# Patient Record
Sex: MALE | Race: WHITE | NOT HISPANIC OR LATINO | URBAN - METROPOLITAN AREA
[De-identification: names, ages, dates, MRNs, and addresses within clinical notes are randomized per-mention and may not be internally consistent; named-entity substitution may affect disease eponyms.]

---

## 2024-10-20 ENCOUNTER — OFFICE VISIT (OUTPATIENT)
Dept: URGENT CARE | Age: 44
End: 2024-10-20
Payer: COMMERCIAL

## 2024-10-20 VITALS
HEART RATE: 79 BPM | OXYGEN SATURATION: 98 % | TEMPERATURE: 98.3 F | DIASTOLIC BLOOD PRESSURE: 87 MMHG | SYSTOLIC BLOOD PRESSURE: 135 MMHG

## 2024-10-20 DIAGNOSIS — J03.00 STREP TONSILLITIS: Primary | ICD-10-CM

## 2024-10-20 LAB — POC RAPID STREP: POSITIVE

## 2024-10-20 PROCEDURE — 87880 STREP A ASSAY W/OPTIC: CPT

## 2024-10-20 PROCEDURE — 99214 OFFICE O/P EST MOD 30 MIN: CPT

## 2024-10-20 PROCEDURE — 99070 SPECIAL SUPPLIES PHYS/QHP: CPT

## 2024-10-20 RX ORDER — CLINDAMYCIN HYDROCHLORIDE 300 MG/1
300 CAPSULE ORAL 3 TIMES DAILY
Qty: 30 CAPSULE | Refills: 0 | Status: SHIPPED | OUTPATIENT
Start: 2024-10-20 | End: 2024-10-30

## 2024-10-20 NOTE — PROGRESS NOTES
Subjective   History of Present Illness: Antwon Carroll is a 44 y.o. male. They present today with a chief complaint of Sore Throat (Yesterday exposed to strep from wife).        Past Medical History  Allergies as of 10/20/2024 - Reviewed 10/20/2024   Allergen Reaction Noted    Penicillins Unknown 10/20/2024       (Not in a hospital admission)       History reviewed. No pertinent past medical history.    History reviewed. No pertinent surgical history.         Review of Systems  Review of Systems                               Objective    Vitals:    10/20/24 0815   BP: 135/87   Pulse: 79   Temp: 36.8 °C (98.3 °F)   SpO2: 98%     No LMP for male patient.    Physical Exam  HENT:      Head: Normocephalic and atraumatic.      Nose: Nose normal.      Mouth/Throat:      Mouth: Mucous membranes are moist.      Pharynx: Uvula midline. Pharyngeal swelling and posterior oropharyngeal erythema present.   Eyes:      Extraocular Movements: Extraocular movements intact.      Conjunctiva/sclera: Conjunctivae normal.      Pupils: Pupils are equal, round, and reactive to light.   Cardiovascular:      Rate and Rhythm: Normal rate and regular rhythm.   Pulmonary:      Effort: Pulmonary effort is normal.      Breath sounds: Normal breath sounds.   Skin:     General: Skin is warm.   Neurological:      Mental Status: He is alert and oriented to person, place, and time.   Psychiatric:         Mood and Affect: Mood normal.         Behavior: Behavior normal.             Point of Care Test & Imaging Results from this visit  Results for orders placed or performed in visit on 10/20/24   POCT rapid strep A manually resulted   Result Value Ref Range    POC Rapid Strep Positive (A) Negative      No results found.    Diagnostic study results (if any) were reviewed by Ang Suarez PA-C.    Assessment/Plan   Allergies, medications, history, and pertinent labs/EKGs/Imaging reviewed by Ang Suarez PA-C.     Medical Decision Making  -          Patient is educated about their diagnoses.     -          Discussed medications benefits and adverse effects.     -          Answered all patient’s questions.     -          Patient will call 911 or go to the nearest ED if worsen symptoms .     -          Patient is agreeable to the plan of care and is deemed stable upon discharge.     -          Follow up with your primary care provider in two days.      Orders and Diagnoses  Diagnoses and all orders for this visit:  Strep tonsillitis  -     clindamycin (Cleocin HCL) 300 mg capsule; Take 1 capsule (300 mg) by mouth 3 times a day for 10 days.  -     POCT rapid strep A manually resulted      Medical Admin Record      Patient disposition: Home    Electronically signed by Ang Suarez PA-C  8:26 AM